# Patient Record
Sex: MALE | URBAN - METROPOLITAN AREA
[De-identification: names, ages, dates, MRNs, and addresses within clinical notes are randomized per-mention and may not be internally consistent; named-entity substitution may affect disease eponyms.]

---

## 2022-12-27 ENCOUNTER — OFFICE (OUTPATIENT)
Dept: URBAN - METROPOLITAN AREA CLINIC 67 | Facility: CLINIC | Age: 32
End: 2022-12-27

## 2022-12-27 VITALS
OXYGEN SATURATION: 99 % | HEART RATE: 76 BPM | HEIGHT: 69 IN | DIASTOLIC BLOOD PRESSURE: 72 MMHG | WEIGHT: 214 LBS | SYSTOLIC BLOOD PRESSURE: 124 MMHG

## 2022-12-27 DIAGNOSIS — R10.11 RIGHT UPPER QUADRANT PAIN: ICD-10-CM

## 2022-12-27 DIAGNOSIS — K92.0 HEMATEMESIS: ICD-10-CM

## 2022-12-27 PROCEDURE — 99204 OFFICE O/P NEW MOD 45 MIN: CPT | Performed by: INTERNAL MEDICINE

## 2022-12-27 RX ORDER — OMEPRAZOLE 40 MG/1
CAPSULE, DELAYED RELEASE ORAL
Qty: 30 | Refills: 1 | Status: ACTIVE
Start: 2022-12-27

## 2022-12-27 NOTE — SERVICENOTES
We will plan for an EGD given his ongoing symptoms and I will also change his Pepcid to Omeprazole 40mg/day.

## 2022-12-27 NOTE — SERVICEHPINOTES
Jesús Walker   is seen for an initial visit today after a   recent ER visit. He was seen in the [place] [facility] on 12/24/22 secondary to a 2 week history of RUQ discomfort, bloody diarrhea. brBlood work demonstrated a normal CBC, lipase and CMP - a contrasted CT of the abd/pelvis was without any acute findings but did show bilateral suspected renal cysts. He was discharged home with prescriptions for Pepcid and Zofran.
br
br    Today, he reports that he initially felt better but then he has had some recurrence of his RUQ discomfort with an episode of coffee-ground emesis and irregular/fluctuating bowel habits but no hematochezia - he denies any history of PUD or excessive NSAID use.

## 2023-01-04 ENCOUNTER — AMBULATORY SURGICAL CENTER (OUTPATIENT)
Dept: URBAN - METROPOLITAN AREA SURGERY 19 | Facility: SURGERY | Age: 33
End: 2023-01-04

## 2023-01-04 DIAGNOSIS — R10.11 RIGHT UPPER QUADRANT PAIN: ICD-10-CM

## 2023-01-04 DIAGNOSIS — K92.0 HEMATEMESIS: ICD-10-CM

## 2023-01-04 LAB
RELEVANT H&P ENDOSCOPY: (no result)
RELEVANT H&P ENDOSCOPY: (no result)

## 2023-01-04 PROCEDURE — 43235 EGD DIAGNOSTIC BRUSH WASH: CPT | Performed by: INTERNAL MEDICINE
